# Patient Record
Sex: FEMALE | Race: BLACK OR AFRICAN AMERICAN | NOT HISPANIC OR LATINO | URBAN - METROPOLITAN AREA
[De-identification: names, ages, dates, MRNs, and addresses within clinical notes are randomized per-mention and may not be internally consistent; named-entity substitution may affect disease eponyms.]

---

## 2025-03-01 ENCOUNTER — EMERGENCY (EMERGENCY)
Facility: HOSPITAL | Age: 25
LOS: 1 days | Discharge: HOME | End: 2025-03-01
Attending: EMERGENCY MEDICINE | Admitting: EMERGENCY MEDICINE
Payer: COMMERCIAL

## 2025-03-01 VITALS
SYSTOLIC BLOOD PRESSURE: 101 MMHG | OXYGEN SATURATION: 97 % | RESPIRATION RATE: 18 BRPM | HEART RATE: 57 BPM | DIASTOLIC BLOOD PRESSURE: 68 MMHG

## 2025-03-01 VITALS
RESPIRATION RATE: 18 BRPM | WEIGHT: 223.11 LBS | OXYGEN SATURATION: 99 % | HEIGHT: 67 IN | TEMPERATURE: 97 F | DIASTOLIC BLOOD PRESSURE: 80 MMHG | SYSTOLIC BLOOD PRESSURE: 120 MMHG | HEART RATE: 82 BPM

## 2025-03-01 PROCEDURE — 99284 EMERGENCY DEPT VISIT MOD MDM: CPT

## 2025-03-01 PROCEDURE — 99283 EMERGENCY DEPT VISIT LOW MDM: CPT

## 2025-03-01 RX ORDER — DIPHENHYDRAMINE HCL 12.5MG/5ML
50 ELIXIR ORAL ONCE
Refills: 0 | Status: COMPLETED | OUTPATIENT
Start: 2025-03-01 | End: 2025-03-01

## 2025-03-01 RX ORDER — DIPHENHYDRAMINE HCL 12.5MG/5ML
1 ELIXIR ORAL
Qty: 9 | Refills: 0
Start: 2025-03-01 | End: 2025-03-03

## 2025-03-01 RX ORDER — PREDNISONE 20 MG/1
1 TABLET ORAL
Qty: 3 | Refills: 0
Start: 2025-03-01 | End: 2025-03-03

## 2025-03-01 RX ORDER — PREDNISONE 20 MG/1
40 TABLET ORAL ONCE
Refills: 0 | Status: COMPLETED | OUTPATIENT
Start: 2025-03-01 | End: 2025-03-01

## 2025-03-01 RX ADMIN — PREDNISONE 40 MILLIGRAM(S): 20 TABLET ORAL at 12:29

## 2025-03-01 RX ADMIN — Medication 40 MILLIGRAM(S): at 12:29

## 2025-03-01 RX ADMIN — Medication 50 MILLIGRAM(S): at 12:29

## 2025-03-01 NOTE — ED PROVIDER NOTE - CLINICAL SUMMARY MEDICAL DECISION MAKING FREE TEXT BOX
24-year-old female with allergic reaction, received prednisone, Benadryl, Pepcid, feels improved, discussed the plan with the patient, she will follow-up with primary care and allergy immunology referral.  EpiPen prescription sent

## 2025-03-01 NOTE — ED ADULT TRIAGE NOTE - RESPIRATORY RATE (BREATHS/MIN)
"I fainted" Pt w/ episode of fainting while [place] the beach today.  As per family at bedside, pt fell into his arms and her mom states pt lost consciousness for approx. 10-15 seconds.  Pt did not hit head, denies pain.  Patient reports history of similar episode while at college where she felt dizzy and had to sit down while in the shower.  Pt without distress at this time, ambulating without difficulty. 19

## 2025-03-01 NOTE — ED ADULT TRIAGE NOTE - CHIEF COMPLAINT QUOTE
Patient complaint of itchiness all over her body after eating pineapple. Denies any SOB or throat swelling

## 2025-03-01 NOTE — ED PROVIDER NOTE - OBJECTIVE STATEMENT
24 years old female with allergic reaction after eating pineapple 1 hour prior to the arrival.  Patient states that she has eaten pineapple in the past, experienced some tingling's in her mouth, but did not think much out of it.  Today, after eating pineapple, patient developed  generalized itching,  visited ED.  Denies shortness of breath, nausea vomiting diarrhea, dizziness.  Denies any other symptoms.  History of asthma, denies any other medical problem.
